# Patient Record
Sex: FEMALE
[De-identification: names, ages, dates, MRNs, and addresses within clinical notes are randomized per-mention and may not be internally consistent; named-entity substitution may affect disease eponyms.]

---

## 2022-02-23 ENCOUNTER — NURSE TRIAGE (OUTPATIENT)
Dept: OTHER | Facility: CLINIC | Age: 62
End: 2022-02-23

## 2022-02-24 NOTE — TELEPHONE ENCOUNTER
Subjective: Caller states \"Rash developed after trying on a new swimsuit, a rash developed on her bra area 1 PM today and she took 50 MG of benadryl around 2 PM , then developed a rash to her bottom area around 6 PM and now developed a rash to her lips about 30 minutes \"     Current Symptoms: rash to upper lips/face, chest, and bottom, and back-    Onset: 8 hour ago; gradual    Associated Symptoms: reduced activity    Pain Severity: 0/10; N/A; itching only 4/10, no pain. Temperature: no temp by unknown method    What has been tried: benadryl    LMP: NA Pregnant: NA    Recommended disposition: PCP within 3-4 hours    Care advice provided, patient verbalizes understanding; denies any other questions or concerns; instructed to call back for any new or worsening symptoms. Patient/caller agrees to follow-up with PCP        This triage is a result of a call to [address] [place] [address]. Please do not respond to the triage nurse through this encounter. Any subsequent communication should be directly with the patient. Reason for Disposition   Face becomes swollen   [1] Widespread hives, itching or facial swelling AND [2] onset > 2 hours after exposure to high-risk allergen (e.g., sting, nuts, 1st dose of antibiotic)    Answer Assessment - Initial Assessment Questions  1. APPEARANCE of RASH: \"Describe the rash. \" (e.g., spots, blisters, raised areas, skin peeling, scaly)        Hives raised flat and bright red    2. SIZE: \"How big are the spots? \" (e.g., tip of pen, eraser, coin; inches, centimeters)        Hives small    3. LOCATION: \"Where is the rash located? \"        Upper lips, face, chest, back and bottom. groin    4. COLOR: \"What color is the rash? \" (Note: It is difficult to assess rash color in people with darker-colored skin. When this situation occurs, simply ask the caller to describe what they see.)        Red    5. ONSET: \"When did the rash begin? \"        1 PM    6. FEVER: \"Do you have a fever? \" If Yes, ask: \"What is your temperature, how was it measured, and when did it start? \"        No    7. ITCHING: \"Does the rash itch? \" If Yes, ask: \"How bad is the itch? \" (Scale 1-10; or mild, moderate, severe)      4/10    8. CAUSE: \"What do you think is causing the rash? \"      Unsure, tried on a new swimsuit    9. MEDICATION FACTORS: \"Have you started any new medications within the last 2 weeks? \" (e.g., antibiotics)         None    10. OTHER SYMPTOMS: \"Do you have any other symptoms? \" (e.g., dizziness, headache, sore throat, joint pain)          None    11. PREGNANCY: \"Is there any chance you are pregnant? \" \"When was your last menstrual period? \"          No    Answer Assessment - Initial Assessment Questions  1. APPEARANCE: \"What does the rash look like? \"         Hives raised flat and bright red    2. LOCATION: \"Where is the rash located? \"         Upper lips, face, chest, back , groin    3. NUMBER: \"How many hives are there? \"         Too many to count    4. SIZE: \"How big are the hives? \" (inches, cm, compare to coins) \"Do they all look the same or is there lots of variation in shape and size? \"         4-5 inches of red raised masas    5. ONSET: \"When did the hives begin? \" (Hours or days ago)         8 hours    6. ITCHING: \"Does it itch? \" If Yes, ask: \"How bad is the itch? \"     - MILD: doesn't interfere with normal activities    - MODERATE-SEVERE: interferes with work, school, sleep, or other activities       4/10    7. RECURRENT PROBLEM: \"Have you had hives before? \" If Yes, ask: \"When was the last time? \" and \"What happened that time? \"       Yes, took an oatmeal bath    8. TRIGGERS: \"Were you exposed to any new food, plant, cosmetic product or animal just before the hives began? \"        Tried on a new swimsuit    9. OTHER SYMPTOMS: \"Do you have any other symptoms? \" (e.g., fever, tongue swelling, difficulty breathing, abdominal pain)        Facial swelling    10. PREGNANCY: \"Is there any chance you are pregnant? \" \"When was your last menstrual period? \"        None    Protocols used: RASH OR REDNESS - WIDESPREAD-ADULT-AH, HIVES-ADULT-AH